# Patient Record
Sex: FEMALE | Race: WHITE | Employment: FULL TIME | URBAN - METROPOLITAN AREA
[De-identification: names, ages, dates, MRNs, and addresses within clinical notes are randomized per-mention and may not be internally consistent; named-entity substitution may affect disease eponyms.]

---

## 2022-04-12 ENCOUNTER — TELEPHONE (OUTPATIENT)
Dept: OBGYN CLINIC | Age: 27
End: 2022-04-12

## 2022-04-12 ENCOUNTER — LAB ONLY (OUTPATIENT)
Dept: OBGYN CLINIC | Age: 27
End: 2022-04-12

## 2022-04-12 DIAGNOSIS — O46.90 VAGINAL BLEEDING IN PREGNANCY: Primary | ICD-10-CM

## 2022-04-12 NOTE — TELEPHONE ENCOUNTER
Eryn pro MD Smeltzer, Glennie Sprague, RN  Caller: Unspecified (Today,  9:55 AM)  Cramping is normal in first trimester. As long as she doesn't have any further bleeding She can keep above appt.  If she does we should do a 2 betaHCG 48hr apart     This nurse attempted to reach the patient and left a detailed message regarding MD recommendations    Patient was advised to call back with any further questions.

## 2022-04-12 NOTE — TELEPHONE ENCOUNTER
Call received 9:56am      23year old to be seen on 5/9/2022 for new ob and ultrasound    Patient reports her LMP is 2/27/2021 6w2d pregnant. Patient calling to say that she had some cramping and pink spotting on Friday with wiping and she reports having the cramping again last night and again this am      Patient denies spotting at this time  Patient was advised to increase her po fluids , can take tylenol if needed and was provided pain and bleeding precautions.     Patient is traveling in the next free weeks and was advised to stay hydrated and to get up and move around if sitting for a while    Additional recommendations     Please advise    Thank you

## 2022-04-12 NOTE — TELEPHONE ENCOUNTER
Pt called back to advise she's having more bleeding this morning when wiping and in her urine. She states its bright red blood. Denies passing any blood clots. Pt c/o SAB. Advised pt per MD that if she continued having bleeding he needs HCG level checked 48hrs apart. Scheduled pt for lab visit today and 4/14/22 at 1:20pm.     Pt verbalized understanding, no further questions at this time.

## 2022-04-13 LAB — HCG SERPL-ACNC: 2900 MIU/ML (ref 0–6)

## 2022-04-14 ENCOUNTER — LAB ONLY (OUTPATIENT)
Dept: OBGYN CLINIC | Age: 27
End: 2022-04-14

## 2022-04-14 DIAGNOSIS — O46.90 VAGINAL BLEEDING IN PREGNANCY: Primary | ICD-10-CM

## 2022-04-14 LAB — PROGEST SERPL-MCNC: 12.9 NG/ML

## 2022-04-15 LAB — HCG SERPL-ACNC: 5061 MIU/ML (ref 0–6)

## 2022-04-18 ENCOUNTER — TELEPHONE (OUTPATIENT)
Dept: OBGYN CLINIC | Age: 27
End: 2022-04-18

## 2022-04-18 NOTE — TELEPHONE ENCOUNTER
Call received at 10:25am      23year old patient to be seen on 5/9/2022 for nob visit and ultrasound  Patient calling about her recent lab results and was advised of MD reviewed labs and recommendations and verbalized understanding.       Result Notes     Genoveva Bridges II, MD   4/15/2022  9:14 AM EDT Back to Top        Call & Inform normal rise.  Needs to sign up for mychart     Patient was provided the phone number to set up my chart risk factors

## 2022-06-23 ENCOUNTER — APPOINTMENT (OUTPATIENT)
Dept: ULTRASOUND IMAGING | Age: 27
End: 2022-06-23
Attending: EMERGENCY MEDICINE
Payer: COMMERCIAL

## 2022-06-23 ENCOUNTER — HOSPITAL ENCOUNTER (EMERGENCY)
Age: 27
Discharge: HOME | End: 2022-06-23
Attending: EMERGENCY MEDICINE
Payer: COMMERCIAL

## 2022-06-23 VITALS
DIASTOLIC BLOOD PRESSURE: 61 MMHG | WEIGHT: 130 LBS | TEMPERATURE: 98.9 F | BODY MASS INDEX: 23.04 KG/M2 | OXYGEN SATURATION: 99 % | HEIGHT: 63 IN | HEART RATE: 81 BPM | SYSTOLIC BLOOD PRESSURE: 103 MMHG | RESPIRATION RATE: 16 BRPM

## 2022-06-23 DIAGNOSIS — M79.662 PAIN OF LEFT CALF: Primary | ICD-10-CM

## 2022-06-23 PROCEDURE — 99284 EMERGENCY DEPT VISIT MOD MDM: CPT

## 2022-06-23 PROCEDURE — 93971 EXTREMITY STUDY: CPT

## 2022-06-23 NOTE — ED TRIAGE NOTES
Pt c/o left lower extremity pain x 1 day; pt has h/o DVT with pregnancy; pt is 16 weeks preg; pt is on anticoagulants but missed 3 days last week;; pt ambulated with steady gait to room; pain increases with ambulation and movement 7/10.  Pt denied CP and difficulty breathing

## 2022-06-23 NOTE — ED PROVIDER NOTES
Ms. Gabi Allan is a 25yo female who presents to the ER with complaints of left leg pain. She said that she has a history of DVT in pregnancy. She has been on Lovenox during this pregnancy. She is 16 weeks pregnant. She said that she had missed 3 doses earlier in the week. She began to have some pain yesterday behind her left leg. Therefore, she resumed the Lovenox last night. She denies any swelling to her leg. No fevers or chills. No nausea or vomiting. No chest pain or trouble breathing. She spoke to her OB, who referred her to the ER. No other complaints reported. Past Medical History:   Diagnosis Date    ASCUS with positive high risk HPV cervical 04/22/2021    Cervical high risk HPV (human papillomavirus) test positive     TYPE 16    [facility] venous [facility]) [facility]     Psychiatric disorder     post partum depression       History reviewed. No pertinent surgical history. History reviewed. No pertinent family history. Social History     Socioeconomic History    Marital status:      Spouse name: Not on file    Number of children: Not on file    Years of education: Not on file    Highest education level: Not on file   Occupational History    Not on file   Tobacco Use    Smoking status: Never Smoker    Smokeless tobacco: Never Used   Vaping Use    Vaping Use: Never used   Substance and Sexual Activity    Alcohol use: Not Currently    Drug use: Never    Sexual activity: Yes     Partners: Male     Birth control/protection: None   Other Topics Concern    Not on file   Social History Narrative    Not on file     Social Determinants of Health     Financial Resource Strain:     Difficulty of Paying Living Expenses: Not on file   Food Insecurity:     Worried About Running Out of Food in the Last Year: Not on file    [place] of Food in the Last Year: Not on file   Transportation Needs:     Lack of Transportation (Medical):  Not on file    Lack of Transportation (Non-Medical): Not on file   Physical Activity:     Days of Exercise per Week: Not on file    Minutes of Exercise per Session: Not on file   Stress:     Feeling of Stress : Not on file   Social Connections:     Frequency of Communication with Friends and Family: Not on file    Frequency of Social Gatherings with Friends and Family: Not on file    Attends Cheondo[employer] Services: Not on file    Active Member of [address] [place] [address] or Organizations: Not on file    Attends Club or Organization Meetings: Not on file    Marital Status: Not on file   Intimate Partner Violence:     Fear of Current or Ex-Partner: Not on file    Emotionally Abused: Not on file    Physically Abused: Not on file    Sexually Abused: Not on file   Housing Stability:     Unable to Pay for Housing in the Last Year: Not on file    Number of [place] in the Last Year: Not on file    Unstable Housing in the Last Year: Not on file         ALLERGIES: Patient has no known allergies. Review of Systems   Constitutional: Negative for chills and fever. HENT: Negative for rhinorrhea and sore throat. Respiratory: Negative for cough and shortness of breath. Cardiovascular: Negative for chest pain. Gastrointestinal: Negative for abdominal pain, diarrhea, nausea and vomiting. Genitourinary: Negative for dysuria and urgency. Musculoskeletal:        Leg pain   Skin: Negative for rash. Neurological: Negative for dizziness, weakness and light-headedness. Vitals:    06/23/22 1209 06/23/22 1212 06/23/22 1219 06/23/22 1220   BP: 100/65  (!) 105/52    Pulse: 81      Resp: 16  16 16   SpO2: 98%  98%    Weight:  59 kg (130 lb)     Height:  5' 3\" (1.6 m)              Physical Exam     Vital signs reviewed. Nursing notes reviewed.     Const:  No acute distress, well developed, well nourished  Head:  Atraumatic, normocephalic  Eyes:  PERRL, conjunctiva normal, no scleral icterus  Neck:  Supple, trachea midline  Cardiovascular: Regular rate  Resp: No resp distress, no increased work of breathing  Abd:  Soft, non-tender  MSK: No swelling left lower extremity, mild tenderness to her left calf and just below the left knee, no tenderness explained to the thigh  Neuro:  Alert and oriented x3, no cranial nerve defect  Skin:  Warm, dry, intact  Psych: normal mood and affect, behavior is normal, judgement and thought content is normal          MDM  Number of Diagnoses or Management Options     Amount and/or Complexity of Data Reviewed  Clinical lab tests: ordered and reviewed  Tests in the radiology section of CPT®: ordered and reviewed  Review and summarize past medical records: yes    Patient Progress  Patient progress: stable          Ms. Casi Mcintosh is a 25yo female who presents to the ER with complaints of leg pain. She has a history of DVT and is concerned about recurrent DVT. No signs of infection on exam.  No trauma. Awaiting ultrasound. Pt. Signed out to Dr. Darrin Harp.       Procedures

## 2022-06-23 NOTE — ED NOTES
Patient signed out to me by Dr. Jan Bashir, no evidence of DVT on venous duplex of the left lower extremity. Compartments soft throughout. Well appearing on exam with stable VS.  Discussed continuing Lovenox and following up with her OB for reevaluation, if symptoms worsen or do not improve in 1-2 weeks would have low threshold to repeat US. Will discharge home. The patient has been given verbal and written advice regarding today's presentation including reasons to re-attend, specifically signs and symptoms of concern or worsening condition were discussed and understood by the patient.